# Patient Record
Sex: MALE | Race: WHITE
[De-identification: names, ages, dates, MRNs, and addresses within clinical notes are randomized per-mention and may not be internally consistent; named-entity substitution may affect disease eponyms.]

---

## 2019-02-10 ENCOUNTER — HOSPITAL ENCOUNTER (EMERGENCY)
Dept: HOSPITAL 74 - JER | Age: 19
Discharge: HOME | End: 2019-02-10
Payer: COMMERCIAL

## 2019-02-10 VITALS — DIASTOLIC BLOOD PRESSURE: 60 MMHG | TEMPERATURE: 98.4 F | SYSTOLIC BLOOD PRESSURE: 99 MMHG | HEART RATE: 68 BPM

## 2019-02-10 VITALS — BODY MASS INDEX: 20 KG/M2

## 2019-02-10 DIAGNOSIS — K59.00: Primary | ICD-10-CM

## 2019-02-10 DIAGNOSIS — Z91.14: ICD-10-CM

## 2019-02-10 DIAGNOSIS — I10: ICD-10-CM

## 2019-02-10 LAB
ALBUMIN SERPL-MCNC: 4.3 G/DL (ref 3.4–5)
ALP SERPL-CCNC: 77 U/L (ref 45–117)
ALT SERPL-CCNC: 21 U/L (ref 13–61)
ANION GAP SERPL CALC-SCNC: 6 MMOL/L (ref 8–16)
APPEARANCE UR: CLEAR
AST SERPL-CCNC: 15 U/L (ref 15–37)
BASOPHILS # BLD: 0.6 % (ref 0–2)
BILIRUB SERPL-MCNC: 0.7 MG/DL (ref 0.2–1)
BILIRUB UR STRIP.AUTO-MCNC: NEGATIVE MG/DL
BUN SERPL-MCNC: 21 MG/DL (ref 7–18)
CALCIUM SERPL-MCNC: 9 MG/DL (ref 8.5–10.1)
CHLORIDE SERPL-SCNC: 106 MMOL/L (ref 98–107)
CO2 SERPL-SCNC: 26 MMOL/L (ref 21–32)
COLOR UR: (no result)
CREAT SERPL-MCNC: 0.9 MG/DL (ref 0.55–1.3)
DEPRECATED RDW RBC AUTO: 13.3 % (ref 11.9–15.9)
EOSINOPHIL # BLD: 1.1 % (ref 0–4.5)
EPITH CASTS URNS QL MICRO: (no result) /HPF
GLUCOSE SERPL-MCNC: 84 MG/DL (ref 74–106)
HCT VFR BLD CALC: 44.5 % (ref 35.4–49)
HGB BLD-MCNC: 15.4 GM/DL (ref 11.7–16.9)
KETONES UR QL STRIP: NEGATIVE
LEUKOCYTE ESTERASE UR QL STRIP.AUTO: (no result)
LYMPHOCYTES # BLD: 13.6 % (ref 8–40)
MCH RBC QN AUTO: 32.4 PG (ref 25.7–33.7)
MCHC RBC AUTO-ENTMCNC: 34.7 G/DL (ref 32–35.9)
MCV RBC: 93.4 FL (ref 80–96)
MONOCYTES # BLD AUTO: 10.8 % (ref 3.8–10.2)
MUCOUS THREADS URNS QL MICRO: (no result)
NEUTROPHILS # BLD: 73.9 % (ref 42.8–82.8)
NITRITE UR QL STRIP: NEGATIVE
PH UR: 6 [PH] (ref 5–8)
PLATELET # BLD AUTO: 257 K/MM3 (ref 134–434)
PMV BLD: 9.1 FL (ref 7.5–11.1)
POTASSIUM SERPLBLD-SCNC: 4.1 MMOL/L (ref 3.5–5.1)
PROT SERPL-MCNC: 7.5 G/DL (ref 6.4–8.2)
PROT UR QL STRIP: NEGATIVE
PROT UR QL STRIP: NEGATIVE
RBC # BLD AUTO: 4.76 M/MM3 (ref 4–5.6)
SODIUM SERPL-SCNC: 138 MMOL/L (ref 136–145)
SP GR UR: 1.01 (ref 1.01–1.03)
UROBILINOGEN UR STRIP-MCNC: NEGATIVE MG/DL (ref 0.2–1)
WBC # BLD AUTO: 11.1 K/MM3 (ref 4–10)

## 2019-02-10 NOTE — PDOC
History of Present Illness





- General


Chief Complaint: Pain, Acute


Stated Complaint: ABDOMINAL PAIN


Time Seen by Provider: 02/10/19 09:10


History Source: Patient


Exam Limitations: No Limitations





- History of Present Illness


Travel History: No


Initial Comments: 





02/10/19 10:03


20 y/o male presents to the ED complaints of lower abdominal cramping which 

began this morning followed by 2 episodes of diarrhea last one having blood-

tinged stool. Patient denies fever, chills recent travel or recent illness. 

Patient denies GI history 


Timing/Duration: reports: intermittent


Quality: reports: mild, cramping


Abdominal Pain Onset Location: reports: epigastric, periumbilical


Pain Radiation: reports: no radiation


Activities at Onset: reports: none


Aggravating Factors: improves with: None


Alleviating Factors: improves with: None





Past History





- Travel


Traveled outside of the country in the last 30 days: No


Close contact w/someone who was outside of country & ill: No





- Past Medical History


Allergies/Adverse Reactions: 


 Allergies











Allergy/AdvReac Type Severity Reaction Status Date / Time


 


peanut Allergy   Verified 02/10/19 08:28


 


COD FISH Allergy Intermediate Rash Uncoded 02/10/19 08:28


 


PEANUTS Allergy   Uncoded 02/10/19 08:28











Home Medications: 


Ambulatory Orders





NK [No Known Home Medication]  02/10/19 








COPD: No


 Disorders: Yes (KIDNEY BX)


HTN: Yes (stopped meds on own)





- Immunization History


Immunization Up to Date: Yes





- Suicide/Smoking/Psychosocial Hx


Smoking History: Never smoked


Have you smoked in the past 12 months: No


Hx Alcohol Use: No


Drug/Substance Use Hx: No


Substance Use Type: None


Patient Lives Alone: No


Lives with/in: parents





Abd/GI Specific PMHX





- Complaint Specific PMHX


Colitis: No


Diverticulitis: No





**Review of Systems





- Review of Systems


Able to Perform ROS?: No


Is the patient limited English proficient: No


Constitutional: No: Symptoms Reported


HEENTM: No: Symptoms Reported


Respiratory: No: Symptoms reported


Cardiac (ROS): No: Symptoms Reported


ABD/GI: Yes: Blood Streaked Bowels, Abdominal cramping


: No: Symptoms Reported


Musculoskeletal: No: Symptoms Reported


Integumentary: No: Symptoms Reported


Neurological: No: Symptoms reported


Endocrine: No: Symptoms Reported


Hematologic/Lymphatic: No: Symptoms Reported





*Physical Exam





- Vital Signs


 Last Vital Signs











Temp Pulse Resp BP Pulse Ox


 


 98.4 F   68   19   99/60   99 


 


 02/10/19 08:29  02/10/19 08:29  02/10/19 08:29  02/10/19 08:29  02/10/19 08:29














- Physical Exam


General Appearance: Yes: Nourished, Appropriately Dressed.  No: Apparent 

Distress


HEENT: negative: Pale Conjunctivae


Respiratory/Chest: positive: Lungs Clear, Normal Breath Sounds.  negative: 

Respiratory Distress, Accessory Muscle Use


Cardiovascular: positive: Regular Rhythm, Regular Rate.  negative: Murmur


Gastrointestinal/Abdominal: positive: Soft, Tenderness (mild left lower quad)


Musculoskeletal: negative: CVA Tenderness


Extremity: positive: Normal Capillary Refill


Integumentary: positive: Normal Color, Warm, Moist


Neurologic: positive: Motor Strength 5/5 (ambulatory)





Moderate Sedation





- Procedure Monitoring


Vital Signs: 


Procedure Monitoring Vital Signs











Temperature  98.4 F   02/10/19 08:29


 


Pulse Rate  68   02/10/19 08:29


 


Respiratory Rate  19   02/10/19 08:29


 


Blood Pressure  99/60   02/10/19 08:29


 


O2 Sat by Pulse Oximetry (%)  99   02/10/19 08:29











ED Treatment Course





- LABORATORY


CBC & Chemistry Diagram: 


 02/10/19 09:42





 02/10/19 09:42





- ADDITIONAL ORDERS


Additional order review: 


 











  02/10/19





  09:42


 


RBC  4.76


 


MCV  93.4


 


MCHC  34.7


 


RDW  13.3


 


MPV  9.1


 


Neutrophils %  73.9


 


Lymphocytes %  13.6  D


 


Monocytes %  10.8 H


 


Eosinophils %  1.1


 


Basophils %  0.6














- RADIOLOGY


Radiology Studies Ordered: 














 Category Date Time Status


 


 ABDOMEN & PELVIS CT W/O CONTR [CT] Stat CT Scan  02/10/19 09:23 Ordered














Medical Decision Making





- Medical Decision Making





02/10/19 10:06


 chief complaint: Lower abdominal cramping accompanied with bloody diarrhea on 

second bowel movement. No other complaints no GI history no travel.


Exam: Left lower quadrant tenderness on exam


Plan: Labs, urine and abdominal CT  


02/10/19 10:19


 Laboratory Tests











  02/10/19 02/10/19





  09:42 09:59


 


WBC  11.1 H 


 


Hgb  15.4 


 


Hct  44.5 


 


Absolute Neuts (auto)  8.2 H 


 


Neutrophils %  73.9 


 


Lymphocytes %  13.6  D 


 


Monocytes %  10.8 H 


 


Ur Leukocyte Esterase   1+ H


 


Urine WBC (Auto)   8


 


Urine RBC (Auto)   None











02/10/19 14:07


CAT scan shows moderate amount of fecal residual in the entire colon. Mid and 

distal as well as the sigmoid colon. There is questionable thickening of the 

rectosigmoid  junction wall. There is no free air free fluid or gross enlarged 

lymph nodes identified. CT suggests constipation. Patient states he does not 

eat vegetables and discussed proper diet with him.





*DC/Admit/Observation/Transfer


Diagnosis at time of Disposition: 


 Constipation








- Discharge Dispostion


Disposition: HOME


Condition at time of disposition: Improved





- Referrals





- Patient Instructions


Printed Discharge Instructions:  DI for Constipation, Increased Dietary Fiber 

May Improve Constipation Conditions With Pelvic Hector


Additional Instructions: 


Read Over information in regards to constipation and increasing her fiber 

intake.


Drink plenty of fluids and add more fruits and vegetables in diet.





- Post Discharge Activity

## 2022-03-04 ENCOUNTER — HOSPITAL ENCOUNTER (EMERGENCY)
Dept: HOSPITAL 74 - JER | Age: 22
Discharge: HOME | End: 2022-03-04
Payer: COMMERCIAL

## 2022-03-04 VITALS — BODY MASS INDEX: 20 KG/M2

## 2022-03-04 VITALS — DIASTOLIC BLOOD PRESSURE: 62 MMHG | SYSTOLIC BLOOD PRESSURE: 121 MMHG | TEMPERATURE: 98 F | HEART RATE: 68 BPM

## 2022-03-04 DIAGNOSIS — J36: Primary | ICD-10-CM

## 2022-03-04 PROCEDURE — 3E023GC INTRODUCTION OF OTHER THERAPEUTIC SUBSTANCE INTO MUSCLE, PERCUTANEOUS APPROACH: ICD-10-PCS

## 2023-07-29 ENCOUNTER — HOSPITAL ENCOUNTER (EMERGENCY)
Dept: HOSPITAL 74 - JER | Age: 23
Discharge: HOME | End: 2023-07-29
Payer: COMMERCIAL

## 2023-07-29 VITALS — TEMPERATURE: 98 F | RESPIRATION RATE: 18 BRPM

## 2023-07-29 VITALS — DIASTOLIC BLOOD PRESSURE: 76 MMHG | HEART RATE: 60 BPM | SYSTOLIC BLOOD PRESSURE: 120 MMHG

## 2023-07-29 VITALS — BODY MASS INDEX: 20.7 KG/M2

## 2023-07-29 DIAGNOSIS — K51.019: Primary | ICD-10-CM

## 2023-07-29 LAB
ALBUMIN SERPL-MCNC: 3.8 G/DL (ref 3.4–5)
ALP SERPL-CCNC: 68 U/L (ref 45–117)
ALT SERPL-CCNC: 24 U/L (ref 13–61)
ANION GAP SERPL CALC-SCNC: 6 MMOL/L (ref 8–16)
AST SERPL-CCNC: 23 U/L (ref 15–37)
BASOPHILS # BLD: 1.2 % (ref 0–2)
BILIRUB SERPL-MCNC: 0.2 MG/DL (ref 0.2–1)
BUN SERPL-MCNC: 16.9 MG/DL (ref 7–18)
CALCIUM SERPL-MCNC: 8.9 MG/DL (ref 8.5–10.1)
CHLORIDE SERPL-SCNC: 105 MMOL/L (ref 98–107)
CO2 SERPL-SCNC: 27 MMOL/L (ref 21–32)
CREAT SERPL-MCNC: 1.1 MG/DL (ref 0.55–1.3)
DEPRECATED RDW RBC AUTO: 14.1 % (ref 11.9–15.9)
EOSINOPHIL # BLD: 2.4 % (ref 0–4.5)
GLUCOSE SERPL-MCNC: 98 MG/DL (ref 74–106)
HCT VFR BLD CALC: 41.7 % (ref 35.4–49)
HGB BLD-MCNC: 13.7 GM/DL (ref 11.7–16.9)
LIPASE SERPL-CCNC: 96 U/L (ref 73–393)
LYMPHOCYTES # BLD: 19.4 % (ref 8–40)
MCH RBC QN AUTO: 30.3 PG (ref 25.7–33.7)
MCHC RBC AUTO-ENTMCNC: 32.8 G/DL (ref 32–35.9)
MCV RBC: 92.2 FL (ref 80–96)
MONOCYTES # BLD AUTO: 14.1 % (ref 3.8–10.2)
NEUTROPHILS # BLD: 62.9 % (ref 42.8–82.8)
PLATELET # BLD AUTO: 323 10^3/UL (ref 134–434)
PMV BLD: 8.1 FL (ref 7.5–11.1)
POTASSIUM SERPLBLD-SCNC: 4.2 MMOL/L (ref 3.5–5.1)
PROT SERPL-MCNC: 7.1 G/DL (ref 6.4–8.2)
RBC # BLD AUTO: 4.52 M/MM3 (ref 4–5.6)
SODIUM SERPL-SCNC: 138 MMOL/L (ref 136–145)
WBC # BLD AUTO: 7.8 K/MM3 (ref 4–10)

## 2023-07-29 PROCEDURE — 3E033NZ INTRODUCTION OF ANALGESICS, HYPNOTICS, SEDATIVES INTO PERIPHERAL VEIN, PERCUTANEOUS APPROACH: ICD-10-PCS
